# Patient Record
Sex: MALE | URBAN - METROPOLITAN AREA
[De-identification: names, ages, dates, MRNs, and addresses within clinical notes are randomized per-mention and may not be internally consistent; named-entity substitution may affect disease eponyms.]

---

## 2024-02-06 ENCOUNTER — OFFICE VISIT (OUTPATIENT)
Dept: INTERNAL MEDICINE CLINIC | Facility: CLINIC | Age: 34
End: 2024-02-06
Payer: COMMERCIAL

## 2024-02-06 VITALS
BODY MASS INDEX: 24.37 KG/M2 | SYSTOLIC BLOOD PRESSURE: 122 MMHG | RESPIRATION RATE: 16 BRPM | WEIGHT: 196 LBS | HEART RATE: 56 BPM | DIASTOLIC BLOOD PRESSURE: 81 MMHG | HEIGHT: 75 IN

## 2024-02-06 DIAGNOSIS — Z13.21 ENCOUNTER FOR VITAMIN DEFICIENCY SCREENING: ICD-10-CM

## 2024-02-06 DIAGNOSIS — L50.9 URTICARIA: ICD-10-CM

## 2024-02-06 DIAGNOSIS — Z00.00 PHYSICAL EXAM, ANNUAL: Primary | ICD-10-CM

## 2024-02-06 PROCEDURE — 3074F SYST BP LT 130 MM HG: CPT | Performed by: INTERNAL MEDICINE

## 2024-02-06 PROCEDURE — 99385 PREV VISIT NEW AGE 18-39: CPT | Performed by: INTERNAL MEDICINE

## 2024-02-06 PROCEDURE — 3079F DIAST BP 80-89 MM HG: CPT | Performed by: INTERNAL MEDICINE

## 2024-02-06 PROCEDURE — 3008F BODY MASS INDEX DOCD: CPT | Performed by: INTERNAL MEDICINE

## 2024-02-06 NOTE — PROGRESS NOTES
Subjective:     Patient ID: Jozef Frey is a 33 year old male.  Presents for physical exam    HPI  Patient reports that he has been bothered by heartburns lately, happens more frequently in the evening, he smokes cigarettes in the evening only, drinks coffee in the morning.  Last several weeks on and off experiencing rashes brought picture looks like urticaria hives they come and go, does not happen every day, no new exposures lately.  He does feel stressed more lately, reports no weight loss, no night sweats.  Last 2 weeks had discomfort in the rectal area thinks it is hemorrhoids, on couple occasions saw blood on wiping.  He does not constipated, does spend a lot of time sitting.  Denies abdominal pain or difficulty moving bowel./Declined rectal exam today/.      Review of Systems       Constitutional:  Negative for decreased activity, fever, irritability and lethargy  Cardiovascular:  Negative for chest pain and irregular heartbeat/palpitations  Respiratory:  Negative for cough, dyspnea and wheezing.  Eyes:  Negative for eye discharge and vision loss  Endocrine:  Negative for polydipsia and polyphagia  Integumentary:  Negative for pruritus and rash  Neurological:  Negative for gait disturbance, paresthesias.   Psychiatric:  Negative for inappropriate interaction and psychiatric symptoms  No current outpatient medications on file.     Allergies:No Known Allergies    History reviewed. No pertinent past medical history.   History reviewed. No pertinent surgical history.   History reviewed. No pertinent family history.   Social History:   Social History     Socioeconomic History    Marital status: Unknown   Tobacco Use    Smoking status: Every Day     Types: Cigarettes    Smokeless tobacco: Never    Tobacco comments:     3 cig a day   Vaping Use    Vaping Use: Never used   Substance and Sexual Activity    Alcohol use: Yes     Alcohol/week: 2.0 standard drinks of alcohol     Types: 2 Cans of beer per week      Comment: daily    Drug use: Never        /81 (BP Location: Left arm, Patient Position: Sitting, Cuff Size: large)   Pulse 56   Resp 16   Ht 6' 3\" (1.905 m)   Wt 196 lb (88.9 kg)   BMI 24.50 kg/m²    Physical Exam  Constitutional:       Appearance: Normal appearance.   HENT:      Head: Normocephalic and atraumatic.   Eyes:      General: No scleral icterus.     Extraocular Movements: Extraocular movements intact.      Conjunctiva/sclera: Conjunctivae normal.      Pupils: Pupils are equal, round, and reactive to light.   Neck:      Vascular: No carotid bruit.   Cardiovascular:      Rate and Rhythm: Normal rate and regular rhythm.      Heart sounds: No murmur heard.     No gallop.   Pulmonary:      Effort: Pulmonary effort is normal.      Breath sounds: No wheezing or rhonchi.   Musculoskeletal:         General: Normal range of motion.      Cervical back: Normal range of motion and neck supple.      Right lower leg: No edema.      Left lower leg: No edema.   Lymphadenopathy:      Cervical: No cervical adenopathy.   Skin:     General: Skin is warm.      Coloration: Skin is not jaundiced.   Neurological:      General: No focal deficit present.      Mental Status: He is alert and oriented to person, place, and time. Mental status is at baseline.   Psychiatric:         Mood and Affect: Mood normal.         Behavior: Behavior normal.         Thought Content: Thought content normal.         Assessment & Plan:   1. Physical exam, annual continue healthy diet maintain healthy weight, will check labs   2. Encounter for vitamin deficiency screening check vitamin D level treat if necessary   3. Urticaria discussed etiology of urticaria, provided educational material, patient can start Allegra 180 mg once a day if episodes become more frequent, advised at times urticaria could be a sign of other systemic disorder patient to follow-up if symptoms worsen.       Orders Placed This Encounter   Procedures    CBC With  Differential With Platelet    Comp Metabolic Panel (14)    Lipid Panel    TSH W Reflex To Free T4    Vitamin D       Meds This Visit:  Requested Prescriptions      No prescriptions requested or ordered in this encounter       Imaging & Referrals:  None

## 2024-02-11 PROBLEM — L50.9 URTICARIA: Status: ACTIVE | Noted: 2024-02-11

## 2024-05-14 ENCOUNTER — OFFICE VISIT (OUTPATIENT)
Dept: INTERNAL MEDICINE CLINIC | Facility: CLINIC | Age: 34
End: 2024-05-14

## 2024-05-14 ENCOUNTER — NURSE TRIAGE (OUTPATIENT)
Dept: INTERNAL MEDICINE CLINIC | Facility: CLINIC | Age: 34
End: 2024-05-14

## 2024-05-14 VITALS
HEART RATE: 61 BPM | BODY MASS INDEX: 23.62 KG/M2 | HEIGHT: 75 IN | WEIGHT: 190 LBS | SYSTOLIC BLOOD PRESSURE: 124 MMHG | DIASTOLIC BLOOD PRESSURE: 82 MMHG

## 2024-05-14 DIAGNOSIS — H00.025 HORDEOLUM INTERNUM OF LEFT LOWER EYELID: Primary | ICD-10-CM

## 2024-05-14 PROCEDURE — 3008F BODY MASS INDEX DOCD: CPT | Performed by: NURSE PRACTITIONER

## 2024-05-14 PROCEDURE — 3074F SYST BP LT 130 MM HG: CPT | Performed by: NURSE PRACTITIONER

## 2024-05-14 PROCEDURE — 99213 OFFICE O/P EST LOW 20 MIN: CPT | Performed by: NURSE PRACTITIONER

## 2024-05-14 PROCEDURE — 3079F DIAST BP 80-89 MM HG: CPT | Performed by: NURSE PRACTITIONER

## 2024-05-14 RX ORDER — ERYTHROMYCIN 5 MG/G
1 OINTMENT OPHTHALMIC EVERY 6 HOURS
Qty: 3.5 G | Refills: 1 | Status: SHIPPED | OUTPATIENT
Start: 2024-05-14

## 2024-05-14 NOTE — PROGRESS NOTES
Jozef Frey is a 33 year old male.  HPI:   Pt reports for the past 3-4 days he has had a bump and swelling of the left lower lid. It has now increased in size. Has tried cool compress without relief. He reports some crusting and discharge int he morning. Denies any HA, fever, chills, vision changes, pain, sinus sx. Does not wear contact lenses.  Current Outpatient Medications   Medication Sig Dispense Refill    erythromycin 5 MG/GM Ophthalmic Ointment Place 1 Application into the left eye every 6 (six) hours. 3.5 g 1      History reviewed. No pertinent past medical history.   Social History:  Social History     Socioeconomic History    Marital status: Unknown   Tobacco Use    Smoking status: Every Day     Types: Cigarettes    Smokeless tobacco: Never    Tobacco comments:     3 cig a day   Vaping Use    Vaping status: Never Used   Substance and Sexual Activity    Alcohol use: Yes     Alcohol/week: 2.0 standard drinks of alcohol     Types: 2 Cans of beer per week     Comment: daily    Drug use: Never        REVIEW OF SYSTEMS:   Review of Systems   All other systems reviewed and are negative.         EXAM:   /82 (BP Location: Right arm, Patient Position: Sitting, Cuff Size: large)   Pulse 61   Ht 6' 3\" (1.905 m)   Wt 190 lb (86.2 kg)   BMI 23.75 kg/m²     Physical Exam  Vitals reviewed.   Constitutional:       General: He is not in acute distress.  HENT:      Head: Normocephalic.      Nose: Nose normal.      Mouth/Throat:      Mouth: Mucous membranes are moist.      Pharynx: Oropharynx is clear.   Eyes:      General: Lids are normal. No scleral icterus.        Right eye: No discharge.         Left eye: Discharge and hordeolum present.     Extraocular Movements: Extraocular movements intact.      Pupils: Pupils are equal, round, and reactive to light.   Neurological:      General: No focal deficit present.      Mental Status: He is alert and oriented to person, place, and time.   Psychiatric:         Mood  and Affect: Mood normal.         Judgment: Judgment normal.            ASSESSMENT AND PLAN:   1. Hordeolum internum of left lower eyelid  -Start abx eye ointment, warm compress, hygiene reviewed.  -If no improvement will need to f/u with ophthalmology.   - erythromycin 5 MG/GM Ophthalmic Ointment; Place 1 Application into the left eye every 6 (six) hours.  Dispense: 3.5 g; Refill: 1       The patient indicates understanding of these issues and agrees to the plan.  The patient is asked to return in PRN.     The above note was creating using Dragon speech recognition technology. Please excuse any typos.

## 2024-05-14 NOTE — TELEPHONE ENCOUNTER
Please reply to pool: EM RN TRIAGE  Action Requested: Summary for Provider     []  Critical Lab, Recommendations Needed  [] Need Additional Advice  [x]   FYI    []   Need Orders  [] Need Medications Sent to Pharmacy  []  Other     SUMMARY: Patient contacts clinic reporting left eye swelling, redness and crusting.  Tender to touch.  Denies blurred vision or other viral symptoms.  Acute visit booked today.    Reason for call: Eye Problem  Onset: Data Unavailable                       Reason for Disposition   SEVERE eyelid swelling on one side that is red and painful (or tender to touch)    Protocols used: Eye - Swelling-A-[place]

## 2025-02-03 ENCOUNTER — NURSE TRIAGE (OUTPATIENT)
Dept: INTERNAL MEDICINE CLINIC | Facility: CLINIC | Age: 35
End: 2025-02-03

## 2025-02-03 NOTE — TELEPHONE ENCOUNTER
Action Requested: Summary for Provider     []  Critical Lab, Recommendations Needed  [] Need Additional Advice  []   FYI    []   Need Orders  [] Need Medications Sent to Pharmacy  []  Other     SUMMARY: Per Protocol disposition advised office visit, patient declines. Will call back if symptoms worsen.     Reason for call: Eye Problem  Onset: a few days    Patient (name and  verified) calling with symptoms of a stye and wants medication over the phone. Patient states that he had this last year. Recommended an office visit for eval but patient declines and will call back in a few days if the symptoms do not improve on their own.   Reason for Disposition  • MILD eye pain and present < 24 hours    Protocols used: Eye Pain and Other Symptoms-A-[place]

## 2025-05-08 ENCOUNTER — APPOINTMENT (OUTPATIENT)
Age: 35
End: 2025-05-08